# Patient Record
(demographics unavailable — no encounter records)

---

## 2025-05-28 NOTE — HISTORY OF PRESENT ILLNESS
[FreeTextEntry1] : Patient is a pleasant 53-year-old female with past medical history of hypertension, hyperlipidemia, who was referred by her primary GI doctor.  Patient had endoscopy and colonoscopy and was referred for evaluation by [facility] for 2 small subepithelial lesions detected during endoscopy in the stomach.  Patient denies any family history of GI cancers.  Patient denies any toxic habits.  Patient currently without any abdominal complaints.

## 2025-05-28 NOTE — ASSESSMENT
[FreeTextEntry1] : Patient is a pleasant 53-year-old female with past medical history of hypertension, hyperlipidemia, who was referred by her primary GI doctor.  Patient had endoscopy and colonoscopy and was referred for evaluation by [facility] for 2 small subepithelial lesions detected during endoscopy in the stomach.  Patient denies any family history of GI cancers.  Patient denies any toxic habits.  Patient currently without any abdominal complaints.  Subepithelial Gastric Lesion - 2 small lesions seen on EGD with Dr Yeoun - Plan EGD with EUS- She is not on AC or AP - Discussed procedure including risk - Patient did have coughing after scope done in office with desaturation